# Patient Record
Sex: MALE | Race: WHITE | NOT HISPANIC OR LATINO | Employment: FULL TIME | URBAN - METROPOLITAN AREA
[De-identification: names, ages, dates, MRNs, and addresses within clinical notes are randomized per-mention and may not be internally consistent; named-entity substitution may affect disease eponyms.]

---

## 2018-03-19 ENCOUNTER — TELEPHONE (OUTPATIENT)
Dept: NEUROLOGY | Facility: CLINIC | Age: 73
End: 2018-03-19

## 2018-08-01 ENCOUNTER — OFFICE VISIT (OUTPATIENT)
Dept: NEUROLOGY | Facility: CLINIC | Age: 73
End: 2018-08-01
Payer: MEDICARE

## 2018-08-01 VITALS
HEART RATE: 77 BPM | WEIGHT: 151 LBS | BODY MASS INDEX: 26.75 KG/M2 | DIASTOLIC BLOOD PRESSURE: 68 MMHG | SYSTOLIC BLOOD PRESSURE: 124 MMHG | HEIGHT: 63 IN

## 2018-08-01 DIAGNOSIS — G25.0 TREMOR, ESSENTIAL: Primary | ICD-10-CM

## 2018-08-01 PROCEDURE — 99203 OFFICE O/P NEW LOW 30 MIN: CPT | Performed by: PSYCHIATRY & NEUROLOGY

## 2018-08-01 RX ORDER — MELATONIN
1000 DAILY
COMMUNITY

## 2018-08-01 NOTE — PATIENT INSTRUCTIONS
Tremor consistent with diagnosis of Essential tremor  Medications discussed and if you wish to start one call office

## 2018-08-01 NOTE — PROGRESS NOTES
Patient ID: Ck Morgan is a 75 y o  male  Assessment/Plan:    Tremor, essential  Slowly progressive action and intentional tremors of the hands, without any parkinsonian symptoms, consistent with his diagnosis of essential tremor  We discussed the slowly progressive nature of this condition, its pathology, and medication treatment and future surgical options  We also spoke about use of  Heavy utensils pens, wrist weights  At this point given tremor is only bothersome when stressed, he is not interested in treating it  They wished to return in a year to reexamine for progression and discuss medication options once again  Diagnoses and all orders for this visit:    Tremor, essential    Other orders  -     aspirin 81 MG tablet; Take 81 mg by mouth daily  -     cholecalciferol (VITAMIN D3) 1,000 units tablet; Take 1,000 Units by mouth daily  -     Multiple Vitamins-Minerals (PROTEGRA PO); Take 1 tablet by mouth daily           Subjective:    Ck Morgan is a 75year old  right handed man with essential tremor who presents for movement disorder evaluation  Symptoms began over 10 years ago with right hand tremor  It slowly worsened over the years  He has no head, vocal, or leg tremor  Tremor is intermittent, and present with action mostly in right arm, on a rare occasion on the left  It is worse with stress and improve when not stressed  It does not interfere with eating, using utensils, or drinking  He has no trouble buttoning  Handwriting is normal  He does not know if tremor improves with alcohol consumption  Medications for tremor have not been tried  There has been no exposure to neuroleptics  There is no associated weakness, numbness, or posturing  he denies any issues with gait or stiffness   There is no family history of tremors                                                                                                                                                       The following portions of the patient's history were reviewed and updated as appropriate: allergies, current medications, past family history, past medical history, past social history, past surgical history and problem list          Objective:    Blood pressure 124/68, pulse 77, height 5' 3" (1 6 m), weight 68 5 kg (151 lb)  Physical Exam   Constitutional: He appears well-developed  Eyes: EOM are normal  Pupils are equal, round, and reactive to light  Neurological: He has normal strength and normal reflexes  Gait normal    Psychiatric: His speech is normal    Vitals reviewed  Neurological Exam    Mental Status  The patient is alert and oriented to person, place, time, and situation  His recent and remote memory are normal  His speech is normal  His language is fluent with no aphasia  He has normal attention span and concentration  He follows multi-step commands  He has a normal fund of knowledge  Cranial Nerves  CN I: The patient has not tested  CN II: The patient's visual acuity and visual fields are normal   CN III, IV, VI: The patient's pupils are equally round and reactive to light and ocular movements are normal   CN V: The patient has normal facial sensation  CN VII:  The patient has symmetric facial movement  CN VIII:  The patient's hearing is normal   CN IX, X: The patient has symmetric palate movement  CN XI: The patient's shoulder shrug strength is normal   CN XII: The patient's tongue is midline without atrophy or fasciculations  Motor   His overall muscle tone is normal throughout  His strength is 5/5 throughout all four extremities  Sensory  The patient's sensation is to light touch  Reflexes  Deep tendon reflexes are 2+ and symmetric in all four extremities with downgoing toes bilaterally  Gait and Coordination  The patient has normal gait and station  ROS:    Review of Systems   Constitutional: Negative  Negative for appetite change and fever  HENT: Negative    Negative for hearing loss, tinnitus, trouble swallowing and voice change  Eyes: Negative  Negative for photophobia and pain  Respiratory: Negative  Negative for shortness of breath  Cardiovascular: Negative  Negative for palpitations  Gastrointestinal: Negative  Negative for nausea and vomiting  Endocrine: Negative  Negative for cold intolerance and heat intolerance  Genitourinary: Negative  Negative for dysuria, frequency and urgency  Musculoskeletal: Negative  Negative for myalgias and neck pain  Skin: Negative  Negative for rash  Neurological: Positive for tremors  Negative for dizziness, seizures, syncope, facial asymmetry, speech difficulty, weakness, light-headedness, numbness and headaches  Hematological: Negative  Does not bruise/bleed easily  Psychiatric/Behavioral: Negative  Negative for confusion, hallucinations and sleep disturbance

## 2018-08-01 NOTE — ASSESSMENT & PLAN NOTE
Slowly progressive action and intentional tremors of the hands, without any parkinsonian symptoms, consistent with his diagnosis of essential tremor  We discussed the slowly progressive nature of this condition, its pathology, and medication treatment and future surgical options  We also spoke about use of  Heavy utensils pens, wrist weights  At this point given tremor is only bothersome when stressed, he is not interested in treating it  They wished to return in a year to reexamine for progression and discuss medication options once again

## 2018-11-21 ENCOUNTER — DOCTOR'S OFFICE (OUTPATIENT)
Dept: URBAN - METROPOLITAN AREA CLINIC 137 | Facility: CLINIC | Age: 73
Setting detail: OPHTHALMOLOGY
End: 2018-11-21
Payer: COMMERCIAL

## 2018-11-21 DIAGNOSIS — S05.01XA: ICD-10-CM

## 2018-11-21 PROCEDURE — 99213 OFFICE O/P EST LOW 20 MIN: CPT | Performed by: OPTOMETRIST

## 2018-11-21 ASSESSMENT — REFRACTION_CURRENTRX
OD_ADD: +2.50
OS_OVR_VA: 20/
OD_VPRISM_DIRECTION: PROGS
OD_CYLINDER: +1.00
OD_AXIS: 170
OS_CYLINDER: +0.75
OD_OVR_VA: 20/
OS_ADD: +2.50
OS_SPHERE: +5.75
OD_OVR_VA: 20/
OS_AXIS: 025
OS_VPRISM_DIRECTION: PROGS
OS_OVR_VA: 20/
OS_OVR_VA: 20/
OD_OVR_VA: 20/
OD_SPHERE: +5.75

## 2018-11-21 ASSESSMENT — REFRACTION_MANIFEST
OD_VA1: 20/20-
OS_VA3: 20/
OU_VA: 20/
OD_AXIS: 177
OD_VA3: 20/
OD_CYLINDER: +1.00
OD_VA1: 20/
OS_VA3: 20/
OS_VA1: 20/
OD_VA3: 20/
OD_VA2: 20/20(J1+)
OD_SPHERE: +6.00
OS_VA1: 20/20-
OS_SPHERE: +6.50
OD_VA2: 20/
OS_ADD: +2.50
OS_CYLINDER: +0.75
OS_AXIS: 003
OU_VA: 20/
OS_VA2: 20/
OD_ADD: +2.50
OS_VA2: 20/20(J1+)

## 2018-11-21 ASSESSMENT — VISUAL ACUITY
OD_BCVA: 20/20-1
OS_BCVA: 20/25

## 2018-11-21 ASSESSMENT — SPHEQUIV_DERIVED
OS_SPHEQUIV: 6.875
OS_SPHEQUIV: 6.875
OD_SPHEQUIV: 6.5
OD_SPHEQUIV: 6

## 2018-11-21 ASSESSMENT — CORNEAL TRAUMA - FOREIGN BODY
OS_FOREIGNBODY: ABSENT
OD_FOREIGNBODY: ABSENT

## 2018-11-21 ASSESSMENT — REFRACTION_AUTOREFRACTION
OS_SPHERE: +6.50
OS_AXIS: 003
OD_CYLINDER: +1.00
OS_CYLINDER: +0.75
OD_AXIS: 177
OD_SPHERE: +5.50

## 2018-11-28 ENCOUNTER — DOCTOR'S OFFICE (OUTPATIENT)
Dept: URBAN - METROPOLITAN AREA CLINIC 137 | Facility: CLINIC | Age: 73
Setting detail: OPHTHALMOLOGY
End: 2018-11-28
Payer: COMMERCIAL

## 2018-11-28 DIAGNOSIS — S05.01XA: ICD-10-CM

## 2018-11-28 PROCEDURE — 99212 OFFICE O/P EST SF 10 MIN: CPT | Performed by: OPTOMETRIST

## 2018-11-28 ASSESSMENT — REFRACTION_AUTOREFRACTION
OD_CYLINDER: +1.00
OD_SPHERE: +5.50
OS_SPHERE: +6.50
OS_CYLINDER: +0.75
OS_AXIS: 003
OD_AXIS: 177

## 2018-11-28 ASSESSMENT — SPHEQUIV_DERIVED
OD_SPHEQUIV: 6
OD_SPHEQUIV: 6.5
OS_SPHEQUIV: 6.875
OS_SPHEQUIV: 6.875

## 2018-11-28 ASSESSMENT — REFRACTION_MANIFEST
OS_SPHERE: +6.50
OU_VA: 20/
OS_AXIS: 003
OD_VA2: 20/20(J1+)
OD_VA2: 20/
OS_VA2: 20/
OD_SPHERE: +6.00
OS_VA1: 20/
OD_CYLINDER: +1.00
OS_ADD: +2.50
OD_VA1: 20/
OS_VA3: 20/
OS_VA3: 20/
OS_VA2: 20/20(J1+)
OS_VA1: 20/20-
OU_VA: 20/
OD_AXIS: 177
OD_ADD: +2.50
OD_VA1: 20/20-
OD_VA3: 20/
OS_CYLINDER: +0.75
OD_VA3: 20/

## 2018-11-28 ASSESSMENT — VISUAL ACUITY
OS_BCVA: 20/20-1
OD_BCVA: 20/25-2

## 2018-11-28 ASSESSMENT — CONFRONTATIONAL VISUAL FIELD TEST (CVF)
OD_FINDINGS: FULL
OS_FINDINGS: FULL

## 2018-11-28 ASSESSMENT — REFRACTION_CURRENTRX
OD_ADD: +2.50
OD_AXIS: 170
OD_OVR_VA: 20/
OD_OVR_VA: 20/
OS_AXIS: 025
OS_SPHERE: +5.75
OS_ADD: +2.50
OD_CYLINDER: +1.00
OS_VPRISM_DIRECTION: PROGS
OD_OVR_VA: 20/
OS_CYLINDER: +0.75
OS_OVR_VA: 20/
OS_OVR_VA: 20/
OD_VPRISM_DIRECTION: PROGS
OS_OVR_VA: 20/
OD_SPHERE: +5.75

## 2018-11-28 ASSESSMENT — CORNEAL TRAUMA - FOREIGN BODY
OD_FOREIGNBODY: ABSENT
OS_FOREIGNBODY: ABSENT

## 2018-12-12 ENCOUNTER — DOCTOR'S OFFICE (OUTPATIENT)
Dept: URBAN - METROPOLITAN AREA CLINIC 137 | Facility: CLINIC | Age: 73
Setting detail: OPHTHALMOLOGY
End: 2018-12-12
Payer: COMMERCIAL

## 2018-12-12 DIAGNOSIS — H25.13: ICD-10-CM

## 2018-12-12 DIAGNOSIS — H52.4: ICD-10-CM

## 2018-12-12 DIAGNOSIS — H40.033: ICD-10-CM

## 2018-12-12 PROBLEM — S05.01XA CORNEAL ABRASION WITHOUT FB; RIGHT EYE INITIAL ENCOUNTER: Status: RESOLVED | Noted: 2018-11-21 | Resolved: 2018-12-12

## 2018-12-12 PROCEDURE — 92015 DETERMINE REFRACTIVE STATE: CPT | Performed by: OPTOMETRIST

## 2018-12-12 PROCEDURE — 92014 COMPRE OPH EXAM EST PT 1/>: CPT | Performed by: OPTOMETRIST

## 2018-12-12 ASSESSMENT — REFRACTION_MANIFEST
OD_VA3: 20/
OD_VA1: 20/20-
OD_VA2: 20/20(J1+)
OD_CYLINDER: -1.00
OS_VA2: 20/
OS_ADD: +2.50
OD_VA2: 20/
OS_VA1: 20/20-
OD_SPHERE: +7.00
OD_VA1: 20/
OU_VA: 20/
OS_AXIS: 090
OS_VA1: 20/
OS_SPHERE: +7.25
OS_VA3: 20/
OS_VA2: 20/20(J1+)
OS_CYLINDER: -0.75
OD_ADD: +2.50
OU_VA: 20/
OD_VA3: 20/
OS_VA3: 20/
OD_AXIS: 090

## 2018-12-12 ASSESSMENT — REFRACTION_CURRENTRX
OD_OVR_VA: 20/
OD_CYLINDER: +1.00
OS_CYLINDER: +0.75
OD_OVR_VA: 20/
OD_AXIS: 170
OS_OVR_VA: 20/
OD_SPHERE: +5.75
OS_SPHERE: +5.75
OS_OVR_VA: 20/
OD_ADD: +2.50
OS_VPRISM_DIRECTION: PROGS
OS_ADD: +2.50
OD_OVR_VA: 20/
OD_VPRISM_DIRECTION: PROGS
OS_AXIS: 025
OS_OVR_VA: 20/

## 2018-12-12 ASSESSMENT — SPHEQUIV_DERIVED
OS_SPHEQUIV: 7
OD_SPHEQUIV: 6.5
OD_SPHEQUIV: 6.5
OS_SPHEQUIV: 6.875

## 2018-12-12 ASSESSMENT — REFRACTION_AUTOREFRACTION
OD_SPHERE: +7.00
OD_AXIS: 71
OS_SPHERE: +7.75
OS_CYLINDER: -1.50
OD_CYLINDER: -1.00
OS_AXIS: 80

## 2018-12-12 ASSESSMENT — VISUAL ACUITY
OS_BCVA: 20/20-1
OD_BCVA: 20/20-1

## 2018-12-12 ASSESSMENT — CONFRONTATIONAL VISUAL FIELD TEST (CVF)
OD_FINDINGS: FULL
OS_FINDINGS: FULL

## 2019-01-04 ENCOUNTER — DOCTOR'S OFFICE (OUTPATIENT)
Dept: URBAN - METROPOLITAN AREA CLINIC 137 | Facility: CLINIC | Age: 74
Setting detail: OPHTHALMOLOGY
End: 2019-01-04
Payer: COMMERCIAL

## 2019-01-04 DIAGNOSIS — H40.033: ICD-10-CM

## 2019-01-04 PROCEDURE — 92132 CPTRZD OPH DX IMG ANT SGM: CPT | Performed by: OPTOMETRIST

## 2019-11-25 ENCOUNTER — TELEPHONE (OUTPATIENT)
Dept: NEUROLOGY | Facility: CLINIC | Age: 74
End: 2019-11-25

## 2019-11-27 ENCOUNTER — OFFICE VISIT (OUTPATIENT)
Dept: NEUROLOGY | Facility: CLINIC | Age: 74
End: 2019-11-27
Payer: MEDICARE

## 2019-11-27 VITALS
DIASTOLIC BLOOD PRESSURE: 71 MMHG | SYSTOLIC BLOOD PRESSURE: 136 MMHG | WEIGHT: 152 LBS | HEART RATE: 90 BPM | HEIGHT: 64 IN | BODY MASS INDEX: 25.95 KG/M2

## 2019-11-27 DIAGNOSIS — G25.0 TREMOR, ESSENTIAL: Primary | ICD-10-CM

## 2019-11-27 PROCEDURE — 99213 OFFICE O/P EST LOW 20 MIN: CPT | Performed by: PSYCHIATRY & NEUROLOGY

## 2019-11-27 RX ORDER — PROPRANOLOL HYDROCHLORIDE 20 MG/1
TABLET ORAL
Qty: 180 TABLET | Refills: 2 | Status: SHIPPED | OUTPATIENT
Start: 2019-11-27 | End: 2020-11-18 | Stop reason: SDUPTHER

## 2019-11-27 NOTE — ASSESSMENT & PLAN NOTE
Mild progression now with increased frequency of tremor which can interfere with certain activities  Time spent discussing treatment options  We will start propranolol at low dose to see if this helps dampen tremor  He will start 20mg qam at least an hour before breakfast  He can add a second dose in the afternoon should he feel that tremor improves but returns before dinner time  He will call if he develop side effects or it is ineffective and we will adjust dose

## 2019-11-27 NOTE — PROGRESS NOTES
Patient ID: Sandi Ellington is a 75 y o  male  Assessment/Plan:    Tremor, essential  Mild progression now with increased frequency of tremor which can interfere with certain activities  Time spent discussing treatment options  We will start propranolol at low dose to see if this helps dampen tremor  He will start 20mg qam at least an hour before breakfast  He can add a second dose in the afternoon should he feel that tremor improves but returns before dinner time  He will call if he develop side effects or it is ineffective and we will adjust dose  Diagnoses and all orders for this visit:    Tremor, essential  -     propranolol (INDERAL) 20 mg tablet; 1 po qam   Can increase to 1 po am and afternoon if needed  Subjective:    Sandi Ellington is a right handed man with essential tremor who presents for movement follow up  Symptoms began over 10 years ago with right hand tremor  It slowly worsened over the years  He was seen 8/2018 and given it was mild at the time he opted not to treat the tremor  Tremor continues to be intermittent, and present with action mostly in right arm, but now more frequently there  It is worse with stress and improve when not stressed  It can be present when eating, using utensils, or drinking  He can have spills if tremor is bad  He can stop it is he notices it  Handwriting is shaky at times  Medications for tremor have not been tried  He has no head, vocal, or leg tremor  There has been no exposure to neuroleptics  There is no associated weakness, numbness, or posturing  he denies any issues with gait or stiffness  There is no family history of tremors                                                                                                                                                    Objective:    Blood pressure 136/71, pulse 90, height 5' 4" (1 626 m), weight 68 9 kg (152 lb)    Neurologic Exam     Mental Status   Oriented to person, place, and time    Level of consciousness: alert  Knowledge: good  Cranial Nerves     CN III, IV, VI   Pupils are equal, round, and reactive to light  Extraocular motions are normal      CN V   Facial sensation intact  CN VII   Facial expression full, symmetric  CN VIII   Hearing: intact    CN IX, X   Palate: symmetric    CN XI   Right sternocleidomastoid strength: normal  Left sternocleidomastoid strength: normal    CN XII   Tongue deviation: none    Motor Exam   Overall muscle tone: normal    Strength   Strength 5/5 throughout  Sensory Exam   Light touch normal      Gait, Coordination, and Reflexes     Gait  Gait: normal    Coordination   Finger-nose-finger test: mild tremor on finger to nose bilaterally  handwriting moderately tremulous but legible  Spirals with moderate tremor  No head or vocal tremor  ROS:    Review of Systems   Constitutional: Negative  Negative for appetite change and fever  HENT: Negative  Negative for hearing loss, tinnitus, trouble swallowing and voice change  Eyes: Negative  Negative for photophobia and pain  Respiratory: Negative  Negative for shortness of breath  Cardiovascular: Negative  Negative for palpitations  Gastrointestinal: Negative  Negative for nausea and vomiting  Endocrine: Negative  Negative for cold intolerance and heat intolerance  Genitourinary: Negative  Negative for dysuria, frequency and urgency  Musculoskeletal: Negative  Negative for myalgias and neck pain  Skin: Negative  Negative for rash  Allergic/Immunologic: Negative  Neurological: Positive for tremors (Right arm)  Negative for dizziness, seizures, syncope, facial asymmetry, speech difficulty, weakness, light-headedness, numbness and headaches  Hematological: Negative  Does not bruise/bleed easily  Psychiatric/Behavioral: Negative  Negative for confusion, hallucinations and sleep disturbance  All other systems reviewed and are negative      Review of system was personally reviewed

## 2019-11-27 NOTE — PATIENT INSTRUCTIONS
We will start propranolol at low dose to see if this helps dampen tremor  Start 20mg qam at least an hour before breakfast  You can add a second dose in the afternoon should you feel that you need it to help with using your hands around dinner time  Call if you develop side effects or if feel it is not working

## 2020-06-01 ENCOUNTER — OFFICE VISIT (OUTPATIENT)
Dept: NEUROLOGY | Facility: CLINIC | Age: 75
End: 2020-06-01
Payer: MEDICARE

## 2020-06-01 VITALS
BODY MASS INDEX: 26.55 KG/M2 | HEIGHT: 64 IN | DIASTOLIC BLOOD PRESSURE: 62 MMHG | TEMPERATURE: 98.1 F | WEIGHT: 155.5 LBS | HEART RATE: 80 BPM | SYSTOLIC BLOOD PRESSURE: 130 MMHG

## 2020-06-01 DIAGNOSIS — G25.0 TREMOR, ESSENTIAL: Primary | ICD-10-CM

## 2020-06-01 PROCEDURE — 99213 OFFICE O/P EST LOW 20 MIN: CPT | Performed by: PSYCHIATRY & NEUROLOGY

## 2020-11-17 ENCOUNTER — TELEPHONE (OUTPATIENT)
Dept: NEUROLOGY | Facility: CLINIC | Age: 75
End: 2020-11-17

## 2020-11-18 ENCOUNTER — OFFICE VISIT (OUTPATIENT)
Dept: NEUROLOGY | Facility: CLINIC | Age: 75
End: 2020-11-18
Payer: MEDICARE

## 2020-11-18 VITALS
WEIGHT: 156.9 LBS | SYSTOLIC BLOOD PRESSURE: 146 MMHG | TEMPERATURE: 97.4 F | DIASTOLIC BLOOD PRESSURE: 61 MMHG | HEIGHT: 65 IN | BODY MASS INDEX: 26.14 KG/M2 | HEART RATE: 84 BPM

## 2020-11-18 DIAGNOSIS — G25.0 TREMOR, ESSENTIAL: ICD-10-CM

## 2020-11-18 PROCEDURE — 99214 OFFICE O/P EST MOD 30 MIN: CPT | Performed by: PHYSICIAN ASSISTANT

## 2020-11-18 RX ORDER — PROPRANOLOL HYDROCHLORIDE 20 MG/1
20 TABLET ORAL DAILY
Qty: 60 TABLET | Refills: 3 | Status: SHIPPED | OUTPATIENT
Start: 2020-11-18 | End: 2021-03-16

## 2021-03-16 DIAGNOSIS — G25.0 TREMOR, ESSENTIAL: ICD-10-CM

## 2021-03-16 RX ORDER — PROPRANOLOL HYDROCHLORIDE 20 MG/1
TABLET ORAL
Qty: 180 TABLET | Refills: 1 | Status: SHIPPED | OUTPATIENT
Start: 2021-03-16